# Patient Record
Sex: FEMALE | Race: WHITE | Employment: UNEMPLOYED | URBAN - NONMETROPOLITAN AREA
[De-identification: names, ages, dates, MRNs, and addresses within clinical notes are randomized per-mention and may not be internally consistent; named-entity substitution may affect disease eponyms.]

---

## 2017-12-05 ENCOUNTER — OFFICE VISIT (OUTPATIENT)
Dept: OBGYN | Age: 32
End: 2017-12-05
Payer: COMMERCIAL

## 2017-12-05 VITALS
BODY MASS INDEX: 36.71 KG/M2 | SYSTOLIC BLOOD PRESSURE: 120 MMHG | HEIGHT: 60 IN | WEIGHT: 187 LBS | DIASTOLIC BLOOD PRESSURE: 82 MMHG

## 2017-12-05 DIAGNOSIS — Z01.419 WELL WOMAN EXAM WITH ROUTINE GYNECOLOGICAL EXAM: Primary | ICD-10-CM

## 2017-12-05 DIAGNOSIS — Z12.4 SCREENING FOR CERVICAL CANCER: ICD-10-CM

## 2017-12-05 PROCEDURE — 99395 PREV VISIT EST AGE 18-39: CPT | Performed by: NURSE PRACTITIONER

## 2017-12-05 ASSESSMENT — ENCOUNTER SYMPTOMS
RESPIRATORY NEGATIVE: 1
GASTROINTESTINAL NEGATIVE: 1
EYES NEGATIVE: 1

## 2017-12-05 NOTE — PROGRESS NOTES
Slim Rebolledo is a 34 y.o. female who presents today for her medical conditions/ complaints as noted below. Slim Rebolledo is c/o of Gynecologic Exam and Contraception (pt thinking about IUD)        HPI  Pt here for annual and pap. Is considering IUD verses OCP. Last mammogram : none  Last pap : 2015  Contraception : none  Last bone density : none  Last colonoscopy : none  Patient's last menstrual period was 11/12/2017 (approximate). [id number]    Past Medical History:   Diagnosis Date    Generalized headaches     MTHFR mutation ([facility] [address]      Past Surgical History:   Procedure Laterality Date    WISDOM TOOTH EXTRACTION       Family History   Problem Relation Age of Onset    Depression Mother     Other Sister      MTHRF    Heart Disease Paternal Grandmother     Heart Attack Paternal Grandmother     Heart Disease Paternal Grandfather     Diabetes Paternal Grandfather     Heart Attack Paternal Grandfather      Social History   Substance Use Topics    Smoking status: Never Smoker    Smokeless tobacco: Never Used    Alcohol use No       No current outpatient prescriptions on file. No current facility-administered medications for this visit. No Known Allergies  Vitals:    12/05/17 1151   BP: 120/82     Body mass index is 36.52 kg/m². Review of Systems   Constitutional: Negative. HENT: Negative. Eyes: Negative. Respiratory: Negative. Cardiovascular: Negative. Gastrointestinal: Negative. Genitourinary: Negative. Negative for dysuria, frequency, menstrual problem, pelvic pain, urgency and vaginal discharge. Skin: Negative. Neurological: Negative. Psychiatric/Behavioral: Negative. Physical Exam   Constitutional: She is oriented to person, place, and time. She appears well-developed and well-nourished. Eyes: Conjunctivae are normal. Right eye exhibits no discharge. Neck: No thyroid mass and no thyromegaly present.    Cardiovascular: Normal rate, regular rhythm and normal heart sounds. No murmur heard. Pulmonary/Chest: Effort normal and breath sounds normal. She has no wheezes. Right breast exhibits no inverted nipple, no mass, no nipple discharge, no skin change and no tenderness. Left breast exhibits no inverted nipple, no mass, no nipple discharge, no skin change and no tenderness. Breasts are symmetrical.   Abdominal: Soft. Bowel sounds are normal. She exhibits no distension and no mass. There is no tenderness. Hernia confirmed negative in the right inguinal area and confirmed negative in the left inguinal area. Genitourinary: Rectal exam shows no external hemorrhoid. No breast swelling, tenderness or discharge. There is no rash, tenderness or lesion on the right labia. There is no rash, tenderness or lesion on the left labia. Uterus is not enlarged and not tender. Cervix exhibits no motion tenderness and no discharge (normal cervical mucosa). Right adnexum displays no mass, no tenderness and no fullness. Left adnexum displays no mass, no tenderness and no fullness. No tenderness in the vagina. No vaginal discharge found. Genitourinary Comments: Pap collected for cervical cytology   Musculoskeletal: Normal range of motion. She exhibits no edema or tenderness. Lymphadenopathy:        Right cervical: No superficial cervical, no deep cervical and no posterior cervical adenopathy present. Left cervical: No superficial cervical, no deep cervical and no posterior cervical adenopathy present. Right axillary: No pectoral and no lateral adenopathy present. Left axillary: No pectoral and no lateral adenopathy present. Right: No inguinal, no supraclavicular and no epitrochlear adenopathy present. Left: No inguinal, no supraclavicular and no epitrochlear adenopathy present. Neurological: She is alert and oriented to person, place, and time. She has normal reflexes. Skin: Skin is warm and dry. No rash noted.    Psychiatric: She has a normal Your doctor can help you decide what is right for you. · Pap test and pelvic exam. Begin Pap tests at age 24. A Pap test is the best way to find cervical cancer. The test often is part of a pelvic exam. Ask how often to have this test.  · Tests for sexually transmitted infections (STIs). Ask whether you should have tests for STIs. You may be at risk if you have sex with more than one person, especially if your partners do not wear condoms. For men  · Tests for sexually transmitted infections (STIs). Ask whether you should have tests for STIs. You may be at risk if you have sex with more than one person, especially if you do not wear a condom. · Testicular cancer exam. Ask your doctor whether you should check your testicles regularly. · Prostate exam. Talk to your doctor about whether you should have a blood test (called a PSA test) for prostate cancer. Experts differ on whether and when men should have this test. Some experts suggest it if you are older than 39 and are -American or have a father or brother who got prostate cancer when he was younger than 72. When should you call for help? Watch closely for changes in your health, and be sure to contact your doctor if you have any problems or symptoms that concern you. Where can you learn more? Go to https://ImpulseSavepeAgilliceb.healthVital Metrix. org and sign in to your [employer] account. Enter P072 in the BakedCode box to learn more about \"Well Visit, Ages 25 to 48: Care Instructions. \"     If you do not have an account, please click on the \"Sign Up Now\" link. Current as of: July 19, 2016  Content Version: 11.3  © 9271-1477 [employer]. Care instructions adapted under license by [facility] [place] ([facility]). If you have questions about a medical condition or this instruction, always ask your healthcare professional. [place] [address] any warranty or liability for your use of this information.

## 2018-02-27 ENCOUNTER — TELEPHONE (OUTPATIENT)
Dept: OBGYN | Age: 33
End: 2018-02-27

## 2018-02-27 NOTE — TELEPHONE ENCOUNTER
VM: Pt wants IUD ordered     CB: Notified pt that [facility] would order and once we hear back from them we would notify her and process can take a few weeks Pt v/u.

## 2018-10-23 ENCOUNTER — TRANSCRIBE ORDERS (OUTPATIENT)
Dept: GENERAL RADIOLOGY | Facility: HOSPITAL | Age: 33
End: 2018-10-23

## 2018-10-23 ENCOUNTER — HOSPITAL ENCOUNTER (OUTPATIENT)
Dept: GENERAL RADIOLOGY | Facility: HOSPITAL | Age: 33
Discharge: HOME | End: 2018-10-23
Attending: INTERNAL MEDICINE | Admitting: INTERNAL MEDICINE
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
## 2018-10-23 DIAGNOSIS — M25.571 RIGHT ANKLE PAIN, UNSPECIFIED CHRONICITY: ICD-10-CM

## 2018-10-23 DIAGNOSIS — M25.571 RIGHT ANKLE PAIN, UNSPECIFIED CHRONICITY: Primary | ICD-10-CM

## 2018-10-23 DIAGNOSIS — M25.572 LEFT ANKLE PAIN, UNSPECIFIED CHRONICITY: ICD-10-CM

## 2018-10-23 PROCEDURE — 73620 X-RAY EXAM OF FOOT: CPT

## 2018-10-23 PROCEDURE — 73600 X-RAY EXAM OF ANKLE: CPT

## 2019-12-16 ENCOUNTER — OFFICE VISIT (OUTPATIENT)
Dept: OBGYN | Age: 34
End: 2019-12-16
Payer: COMMERCIAL

## 2019-12-16 VITALS
HEART RATE: 113 BPM | HEIGHT: 60 IN | WEIGHT: 164 LBS | BODY MASS INDEX: 32.2 KG/M2 | SYSTOLIC BLOOD PRESSURE: 130 MMHG | DIASTOLIC BLOOD PRESSURE: 86 MMHG

## 2019-12-16 DIAGNOSIS — Z01.419 WELL WOMAN EXAM: Primary | ICD-10-CM

## 2019-12-16 DIAGNOSIS — Z11.51 SCREENING FOR HPV (HUMAN PAPILLOMAVIRUS): ICD-10-CM

## 2019-12-16 DIAGNOSIS — Z12.4 SCREENING FOR CERVICAL CANCER: ICD-10-CM

## 2019-12-16 PROCEDURE — 99395 PREV VISIT EST AGE 18-39: CPT | Performed by: NURSE PRACTITIONER

## 2019-12-16 ASSESSMENT — ENCOUNTER SYMPTOMS
EYES NEGATIVE: 1
GASTROINTESTINAL NEGATIVE: 1
RESPIRATORY NEGATIVE: 1

## 2019-12-19 LAB
HPV COMMENT: ABNORMAL
HPV TYPE 16: NOT DETECTED
HPV TYPE 18: DETECTED
HPVOH (OTHER TYPES): DETECTED

## 2020-01-22 ENCOUNTER — PROCEDURE VISIT (OUTPATIENT)
Dept: OBGYN | Age: 35
End: 2020-01-22
Payer: COMMERCIAL

## 2020-01-22 VITALS
WEIGHT: 169 LBS | SYSTOLIC BLOOD PRESSURE: 118 MMHG | DIASTOLIC BLOOD PRESSURE: 74 MMHG | HEIGHT: 60 IN | BODY MASS INDEX: 33.18 KG/M2 | TEMPERATURE: 98.3 F | HEART RATE: 94 BPM

## 2020-01-22 LAB
CONTROL: NORMAL
PREGNANCY TEST URINE, POC: NEGATIVE

## 2020-01-22 PROCEDURE — 57454 BX/CURETT OF CERVIX W/SCOPE: CPT | Performed by: OBSTETRICS & GYNECOLOGY

## 2020-01-22 PROCEDURE — 81025 URINE PREGNANCY TEST: CPT | Performed by: OBSTETRICS & GYNECOLOGY

## 2020-01-22 NOTE — PATIENT INSTRUCTIONS
also a good idea to know your test results and keep a list of the medicines you take. When should you call for help? Call your doctor now or seek immediate medical care if:    · You have severe vaginal bleeding. This means that you are soaking through your usual pads or tampons each hour for 2 or more hours.     · You have pain that does not get better after you take pain medicine.     · You have signs of infection, such as:  ? Increased pain. ? Bad-smelling vaginal discharge. ? A fever.    Watch closely for any changes in your health, and be sure to contact your doctor if:    · You have questions or concerns. Where can you learn more? Go to https://Invested.inpeSRS Medical Systemseb.Growth Oriented Development Software. org and sign in to your [employer] account. Enter M523 in the Vyykn box to learn more about \"Colposcopy: What to Expect at Home. \"     If you do not have an account, please click on the \"Sign Up Now\" link. Current as of: August 21, 2019  Content Version: 12.3  © 5356-0690 Healthwise, Incorporated. Care instructions adapted under license by [facility] ([facility]). If you have questions about a medical condition or this instruction, always ask your healthcare professional. [place] [address] any warranty or liability for your use of this information.

## 2020-01-28 ENCOUNTER — TELEPHONE (OUTPATIENT)
Dept: OBGYN | Age: 35
End: 2020-01-28

## 2020-09-15 ENCOUNTER — OFFICE VISIT (OUTPATIENT)
Dept: OBGYN | Age: 35
End: 2020-09-15
Payer: COMMERCIAL

## 2020-09-15 VITALS
HEART RATE: 101 BPM | WEIGHT: 167 LBS | BODY MASS INDEX: 32.79 KG/M2 | DIASTOLIC BLOOD PRESSURE: 89 MMHG | HEIGHT: 60 IN | SYSTOLIC BLOOD PRESSURE: 119 MMHG

## 2020-09-15 DIAGNOSIS — N92.6 IRREGULAR PERIODS: ICD-10-CM

## 2020-09-15 DIAGNOSIS — Z13.9 ENCOUNTER FOR MEDICAL SCREENING EXAMINATION: ICD-10-CM

## 2020-09-15 LAB
ALBUMIN SERPL-MCNC: 4.4 G/DL (ref 3.5–5.2)
ALP BLD-CCNC: 96 U/L (ref 35–104)
ALT SERPL-CCNC: 13 U/L (ref 5–33)
ANION GAP SERPL CALCULATED.3IONS-SCNC: 13 MMOL/L (ref 7–19)
AST SERPL-CCNC: 16 U/L (ref 5–32)
BASOPHILS ABSOLUTE: 0.1 K/UL (ref 0–0.2)
BASOPHILS RELATIVE PERCENT: 0.7 % (ref 0–1)
BILIRUB SERPL-MCNC: <0.2 MG/DL (ref 0.2–1.2)
BUN BLDV-MCNC: 11 MG/DL (ref 6–20)
CALCIUM SERPL-MCNC: 9.1 MG/DL (ref 8.6–10)
CHLORIDE BLD-SCNC: 104 MMOL/L (ref 98–111)
CO2: 23 MMOL/L (ref 22–29)
CREAT SERPL-MCNC: 0.5 MG/DL (ref 0.5–0.9)
EOSINOPHILS ABSOLUTE: 0.1 K/UL (ref 0–0.6)
EOSINOPHILS RELATIVE PERCENT: 1.2 % (ref 0–5)
ESTRADIOL LEVEL: 75.4 PG/ML
FOLLICLE STIMULATING HORMONE: 4 MIU/ML
GFR AFRICAN AMERICAN: >59
GFR NON-AFRICAN AMERICAN: >60
GLUCOSE BLD-MCNC: 95 MG/DL (ref 74–109)
HCT VFR BLD CALC: 36.6 % (ref 37–47)
HEMOGLOBIN: 11.9 G/DL (ref 12–16)
IMMATURE GRANULOCYTES #: 0 K/UL
LYMPHOCYTES ABSOLUTE: 2.4 K/UL (ref 1.1–4.5)
LYMPHOCYTES RELATIVE PERCENT: 28.6 % (ref 20–40)
MCH RBC QN AUTO: 28.5 PG (ref 27–31)
MCHC RBC AUTO-ENTMCNC: 32.5 G/DL (ref 33–37)
MCV RBC AUTO: 87.8 FL (ref 81–99)
MONOCYTES ABSOLUTE: 0.5 K/UL (ref 0–0.9)
MONOCYTES RELATIVE PERCENT: 6 % (ref 0–10)
NEUTROPHILS ABSOLUTE: 5.3 K/UL (ref 1.5–7.5)
NEUTROPHILS RELATIVE PERCENT: 63.3 % (ref 50–65)
PDW BLD-RTO: 12.1 % (ref 11.5–14.5)
PLATELET # BLD: 329 K/UL (ref 130–400)
PMV BLD AUTO: 10.8 FL (ref 9.4–12.3)
POTASSIUM SERPL-SCNC: 4.1 MMOL/L (ref 3.5–5)
PROGESTERONE LEVEL: 6.32 NG/ML
PROLACTIN: 7.5 NG/ML (ref 4.79–23.3)
RBC # BLD: 4.17 M/UL (ref 4.2–5.4)
SODIUM BLD-SCNC: 140 MMOL/L (ref 136–145)
T4 FREE: 1.06 NG/DL (ref 0.93–1.7)
TESTOSTERONE TOTAL: 12.7 NG/DL (ref 8.4–48.1)
TOTAL PROTEIN: 7.2 G/DL (ref 6.6–8.7)
TSH SERPL DL<=0.05 MIU/L-ACNC: 1.31 UIU/ML (ref 0.27–4.2)
WBC # BLD: 8.3 K/UL (ref 4.8–10.8)

## 2020-09-15 PROCEDURE — 99214 OFFICE O/P EST MOD 30 MIN: CPT | Performed by: NURSE PRACTITIONER

## 2020-09-15 ASSESSMENT — ENCOUNTER SYMPTOMS
ABDOMINAL PAIN: 1
EYES NEGATIVE: 1
RESPIRATORY NEGATIVE: 1
CONSTIPATION: 1

## 2020-09-15 NOTE — PROGRESS NOTES
Armando Sloan is a 38 y.o. female who presents today for her medical conditions/ complaints as noted below. Armando Sloan is c/o of Menstrual Problem        HPI  Patient presents today with complaints of irregular periods. Skipped cycle in August neg pregnancy and Sept was 2 weeks late, extra long and heavy. Right pelvic pain occasionally. Painful gas pain with bowel movements. Last mammogram:  never  Last pap smear:  2019  Contraception:  none  :  2  Para:  2  AB:  0  Last bone density:  never  Last colonoscopy: never  Patient's last menstrual period was 2020 (exact date). [id number]    Past Medical History:   Diagnosis Date    Generalized headaches     HPV (human papilloma virus) anogenital infection     MTHFR mutation [facility])      Past Surgical History:   Procedure Laterality Date    COLPOSCOPY  2020    WISDOM TOOTH EXTRACTION       Family History   Problem Relation Age of Onset    Depression Mother     Other Sister         MTHRF    Heart Disease Paternal Grandmother     Heart Attack Paternal Grandmother     Heart Disease Paternal Grandfather     Diabetes Paternal Grandfather     Heart Attack Paternal Grandfather      Social History     Tobacco Use    Smoking status: Never Smoker    Smokeless tobacco: Never Used   Substance Use Topics    Alcohol use: No       No current outpatient medications on file. No current facility-administered medications for this visit. No Known Allergies  Vitals:    09/15/20 0917   BP: 119/89   Pulse: 101     Body mass index is 32.61 kg/m². Review of Systems   Constitutional: Negative. HENT: Negative. Eyes: Negative. Respiratory: Negative. Cardiovascular: Negative. Gastrointestinal: Positive for abdominal pain (prior to bm) and constipation. Genitourinary: Positive for menstrual problem and pelvic pain. Negative for difficulty urinating, dyspareunia, dysuria, enuresis, frequency, hematuria, urgency and vaginal discharge. Musculoskeletal: Negative. Skin: Negative. Neurological: Negative. Psychiatric/Behavioral: Negative. Physical Exam  Vitals signs and nursing note reviewed. Constitutional:       General: She is not in acute distress. Appearance: She is well-developed. She is not diaphoretic. HENT:      Head: Normocephalic and atraumatic. Eyes:      Conjunctiva/sclera: Conjunctivae normal.      Pupils: Pupils are equal, round, and reactive to light. Neck:      Musculoskeletal: Normal range of motion. Pulmonary:      Effort: Pulmonary effort is normal.   Abdominal:      Tenderness: There is no abdominal tenderness. There is no guarding. Musculoskeletal: Normal range of motion. Comments: Normal ROM in all 4 extremities; normal gait   Skin:     General: Skin is warm and dry. Neurological:      Mental Status: She is alert and oriented to person, place, and time. Motor: No abnormal muscle tone. Coordination: Coordination normal.   Psychiatric:         Behavior: Behavior normal.          Diagnosis Orders   1. Irregular periods  TSH without Reflex    T4, Free    Testosterone    Prolactin    Estradiol    PROGESTERONE    Follicle Stimulating Hormone   2. Pelvic pain     3. Pain associated with defecation     4. Encounter for medical screening examination  CBC Auto Differential    Comprehensive Metabolic Panel       MEDICATIONS:  No orders of the defined types were placed in this encounter. ORDERS:  Orders Placed This Encounter   Procedures    CBC Auto Differential    Comprehensive Metabolic Panel    TSH without Reflex    T4, Free    Testosterone    Prolactin    Estradiol    PROGESTERONE    Follicle Stimulating Hormone       PLAN:  Will start with checking labs. Period due again soon. If skips again, needs US  Patient Instructions   We are committed to providing you with the best care possible.   In order to help us achieve these goals please remember to bring all medications, herbal products, and over the counter supplements with you to each visit. If your provider has ordered testing for you, please be sure to follow up with our office if you have not received results within 7 days after testing took place. *If you receive a survey after visiting one of our offices, please take the time to share your experience concerning your physician office visit. These surveys are confidential and no health information about you is shared. We are eager to improve for you and we are counting on your feedback to help make that happen.

## 2021-02-24 ENCOUNTER — OFFICE VISIT (OUTPATIENT)
Dept: OBGYN CLINIC | Age: 36
End: 2021-02-24
Payer: COMMERCIAL

## 2021-02-24 VITALS
DIASTOLIC BLOOD PRESSURE: 74 MMHG | WEIGHT: 169 LBS | HEART RATE: 103 BPM | SYSTOLIC BLOOD PRESSURE: 131 MMHG | HEIGHT: 60 IN | BODY MASS INDEX: 33.18 KG/M2

## 2021-02-24 DIAGNOSIS — Z01.419 WELL WOMAN EXAM WITH ROUTINE GYNECOLOGICAL EXAM: Primary | ICD-10-CM

## 2021-02-24 DIAGNOSIS — Z12.4 SCREENING FOR CERVICAL CANCER: ICD-10-CM

## 2021-02-24 DIAGNOSIS — Z12.31 ENCOUNTER FOR SCREENING MAMMOGRAM FOR BREAST CANCER: ICD-10-CM

## 2021-02-24 DIAGNOSIS — N94.6 DYSMENORRHEA: ICD-10-CM

## 2021-02-24 DIAGNOSIS — Z11.51 SCREENING FOR HPV (HUMAN PAPILLOMAVIRUS): ICD-10-CM

## 2021-02-24 PROCEDURE — 99395 PREV VISIT EST AGE 18-39: CPT | Performed by: OBSTETRICS & GYNECOLOGY

## 2021-02-24 RX ORDER — ETONOGESTREL AND ETHINYL ESTRADIOL 11.7; 2.7 MG/1; MG/1
1 INSERT, EXTENDED RELEASE VAGINAL
Qty: 3 EACH | Refills: 3 | Status: SHIPPED | OUTPATIENT
Start: 2021-02-24 | End: 2022-09-28

## 2021-02-24 ASSESSMENT — ENCOUNTER SYMPTOMS
GASTROINTESTINAL NEGATIVE: 1
EYES NEGATIVE: 1
RESPIRATORY NEGATIVE: 1

## 2021-02-24 NOTE — PROGRESS NOTES
I, Viridiana Guo RN, am scribing for and in the presence of Dr. Lincoln Reasons 2021/3:15 PM/sign         2021    Janusz Krishna (:  1985) is a 38 y.o. female, here for a preventive medicine evaluation. Pt was seen in 2020 in office due irregular periods x 2 months. She had some labs drawn and she just continued to monitor cycles. Cycles have been regular. She does having painful periods for the past year. Patient Active Problem List   Diagnosis    MTHFR mutation (methylenetetrahydrofolate reductase) ([facility] [address]    Constipation    Kyphosis    Term pregnancy       Review of Systems   Constitutional: Negative. HENT: Negative. Eyes: Negative. Respiratory: Negative. Cardiovascular: Negative. Gastrointestinal: Negative. Genitourinary: Negative for difficulty urinating, dyspareunia, dysuria, enuresis, frequency, hematuria, menstrual problem, pelvic pain, urgency and vaginal discharge. Musculoskeletal: Negative. Skin: Negative. Neurological: Negative. Psychiatric/Behavioral: Negative. Prior to Visit Medications    Medication Sig Taking? Authorizing Provider   etonogestrel-ethinyl estradiol (NUVARING) 0.12-0.015 MG/24HR vaginal ring Place 1 each vaginally every 21 days Insert one (1) ring vaginally and leave in place for three (3) weeks, then remove for one (1) week.  Yes Jakob Aceves MD        No Known Allergies    Past Medical History:   Diagnosis Date    Generalized headaches     HPV (human papilloma virus) anogenital infection     MTHFR mutation [facility])        Past Surgical History:   Procedure Laterality Date    COLPOSCOPY  2020    WISDOM TOOTH EXTRACTION         Social History     Socioeconomic History    Marital status:      Spouse name: Not on file    Number of children: Not on file    Years of education: Not on file    Highest education level: Not on file   Occupational History    Not on file   Social Needs    Financial resource Left Ear: Hearing normal.      Nose: Nose normal.   Eyes:      General: Lids are normal.      Conjunctiva/sclera: Conjunctivae normal.      Pupils: Pupils are equal, round, and reactive to light. Neck:      Musculoskeletal: Normal range of motion and neck supple. Thyroid: No thyromegaly. Trachea: No tracheal deviation. Cardiovascular:      Rate and Rhythm: Normal rate and regular rhythm. Heart sounds: Normal heart sounds. Pulmonary:      Effort: Pulmonary effort is normal. No respiratory distress. Breath sounds: Normal breath sounds. No wheezing. Chest:      Breasts: Breasts are symmetrical.         Right: No inverted nipple, mass, nipple discharge, skin change or tenderness. Left: No inverted nipple, mass, nipple discharge, skin change or tenderness. Abdominal:      General: There is no distension. Palpations: Abdomen is soft. Tenderness: There is no abdominal tenderness. There is no guarding or rebound. Genitourinary:     Labia:         Right: No rash, tenderness or lesion. Left: No rash, tenderness or lesion. Vagina: No vaginal discharge or bleeding. Cervix: No cervical motion tenderness, discharge or friability. Uterus: Not deviated, not enlarged, not fixed and not tender. Adnexa:         Right: No mass, tenderness or fullness. Left: No mass, tenderness or fullness. Rectum: No anal fissure or external hemorrhoid. Comments: Specimen for cervical cytology obtained. Musculoskeletal: Normal range of motion. Comments: Normal ROM in all four extremities; normal gait   Lymphadenopathy:      Head:      Right side of head: No submental, submandibular or tonsillar adenopathy. Left side of head: No submental, submandibular or tonsillar adenopathy. Cervical: No cervical adenopathy. Upper Body:      Right upper body: No supraclavicular adenopathy. Left upper body: No supraclavicular adenopathy. Skin:     General: Skin is warm and dry. Findings: No erythema or rash. Neurological:      Mental Status: She is alert and oriented to person, place, and time. Psychiatric:         Behavior: Behavior normal.         No flowsheet data found. Lab Results   Component Value Date    GLUCOSE 95 09/15/2020       The ASCVD Risk score (Myles Prince., et al., 2013) failed to calculate for the following reasons: The 2013 ASCVD risk score is only valid for ages 36 to 78      There is no immunization history on file for this patient. Health Maintenance   Topic Date Due    Hepatitis C screen  1985    Varicella vaccine (1 of 2 - 2-dose childhood series) 07/10/1985    DTaP/Tdap/Td vaccine (1 - Tdap) 07/10/2003    Flu vaccine (1) 09/01/2020    Cervical cancer screen  12/16/2024    HIV screen  Completed    Hepatitis A vaccine  Aged Out    Hepatitis B vaccine  Aged Out    Hib vaccine  Aged Out    Meningococcal (ACWY) vaccine  Aged Out    Pneumococcal 0-64 years Vaccine  Aged Out       ASSESSMENT/PLAN:  1. Well woman exam with routine gynecological exam  -     PAP SMEAR  2. Screening for cervical cancer  -     PAP SMEAR  3. Screening for HPV (human papillomavirus)  -     Human papillomavirus (HPV) DNA Probe Thin Prep High Risk  4. Dysmenorrhea  5. Encounter for screening mammogram for breast cancer  -     KIMBERLY DIGITAL SCREEN W OR WO CAD BILATERAL; Future    Pap obtained  Reviewed labs from 9/2020  Discussed [facility] for cycle control and pt will try Nuvaring. Return in about 1 year (around 2/24/2022). Laurie Diaz MD, personally performed services described in this document as scribed by Anjali Alonso RN in my presence, and it is both accurate and complete. An electronic signature was used to authenticate this note.

## 2021-03-02 LAB
HPV COMMENT: NORMAL
HPV TYPE 16: NOT DETECTED
HPV TYPE 18: NOT DETECTED
HPVOH (OTHER TYPES): NOT DETECTED

## 2022-09-13 ENCOUNTER — TELEPHONE (OUTPATIENT)
Dept: OBGYN CLINIC | Age: 37
End: 2022-09-13

## 2022-09-13 NOTE — TELEPHONE ENCOUNTER
Sylvia requests a call back please. Her Insurance is due to run out at the end of this month and she is wanting to be seen in the office for a physical, pap and testing. She does not mind who she sees. [facility] is unable to accommodate within the requested time frame. Thank you.

## 2022-09-16 DIAGNOSIS — Z11.3 SCREEN FOR STD (SEXUALLY TRANSMITTED DISEASE): ICD-10-CM

## 2022-09-16 DIAGNOSIS — Z13.9 ENCOUNTER FOR MEDICAL SCREENING EXAMINATION: Primary | ICD-10-CM

## 2022-09-27 DIAGNOSIS — Z11.3 SCREEN FOR STD (SEXUALLY TRANSMITTED DISEASE): ICD-10-CM

## 2022-09-27 DIAGNOSIS — Z13.9 ENCOUNTER FOR MEDICAL SCREENING EXAMINATION: ICD-10-CM

## 2022-09-27 LAB
ALBUMIN SERPL-MCNC: 4.3 G/DL (ref 3.5–5.2)
ALP BLD-CCNC: 88 U/L (ref 35–104)
ALT SERPL-CCNC: 8 U/L (ref 5–33)
ANION GAP SERPL CALCULATED.3IONS-SCNC: 11 MMOL/L (ref 7–19)
AST SERPL-CCNC: 14 U/L (ref 5–32)
BASOPHILS ABSOLUTE: 0 K/UL (ref 0–0.2)
BASOPHILS RELATIVE PERCENT: 0.5 % (ref 0–1)
BILIRUB SERPL-MCNC: 0.4 MG/DL (ref 0.2–1.2)
BUN BLDV-MCNC: 11 MG/DL (ref 6–20)
CALCIUM SERPL-MCNC: 9.1 MG/DL (ref 8.6–10)
CHLORIDE BLD-SCNC: 103 MMOL/L (ref 98–111)
CHOLESTEROL, TOTAL: 193 MG/DL (ref 160–199)
CO2: 24 MMOL/L (ref 22–29)
CREAT SERPL-MCNC: 0.5 MG/DL (ref 0.5–0.9)
EOSINOPHILS ABSOLUTE: 0.1 K/UL (ref 0–0.6)
EOSINOPHILS RELATIVE PERCENT: 1.1 % (ref 0–5)
GFR AFRICAN AMERICAN: >59
GFR NON-AFRICAN AMERICAN: >60
GLUCOSE BLD-MCNC: 100 MG/DL (ref 74–109)
HAV IGM SER IA-ACNC: NORMAL
HBA1C MFR BLD: 5.2 % (ref 4–6)
HCT VFR BLD CALC: 37.9 % (ref 37–47)
HDLC SERPL-MCNC: 69 MG/DL (ref 65–121)
HEMOGLOBIN: 12.3 G/DL (ref 12–16)
HEPATITIS B CORE IGM ANTIBODY: NORMAL
HEPATITIS B SURFACE ANTIGEN INTERPRETATION: NORMAL
HEPATITIS C ANTIBODY INTERPRETATION: NORMAL
HIV-1 P24 AG: NORMAL
IMMATURE GRANULOCYTES #: 0 K/UL
LDL CHOLESTEROL CALCULATED: 109 MG/DL
LYMPHOCYTES ABSOLUTE: 2.3 K/UL (ref 1.1–4.5)
LYMPHOCYTES RELATIVE PERCENT: 30.9 % (ref 20–40)
MCH RBC QN AUTO: 28.9 PG (ref 27–31)
MCHC RBC AUTO-ENTMCNC: 32.5 G/DL (ref 33–37)
MCV RBC AUTO: 89.2 FL (ref 81–99)
MONOCYTES ABSOLUTE: 0.5 K/UL (ref 0–0.9)
MONOCYTES RELATIVE PERCENT: 6.8 % (ref 0–10)
NEUTROPHILS ABSOLUTE: 4.4 K/UL (ref 1.5–7.5)
NEUTROPHILS RELATIVE PERCENT: 60.4 % (ref 50–65)
PDW BLD-RTO: 12 % (ref 11.5–14.5)
PLATELET # BLD: 308 K/UL (ref 130–400)
PMV BLD AUTO: 10.5 FL (ref 9.4–12.3)
POTASSIUM SERPL-SCNC: 4.2 MMOL/L (ref 3.5–5)
RAPID HIV 1&2: NORMAL
RBC # BLD: 4.25 M/UL (ref 4.2–5.4)
SODIUM BLD-SCNC: 138 MMOL/L (ref 136–145)
T4 FREE: 1.15 NG/DL (ref 0.93–1.7)
TOTAL PROTEIN: 6.7 G/DL (ref 6.6–8.7)
TRIGL SERPL-MCNC: 77 MG/DL (ref 0–149)
TSH SERPL DL<=0.05 MIU/L-ACNC: 1.17 UIU/ML (ref 0.27–4.2)
VITAMIN D 25-HYDROXY: 23.5 NG/ML
WBC # BLD: 7.4 K/UL (ref 4.8–10.8)

## 2022-09-28 ENCOUNTER — OFFICE VISIT (OUTPATIENT)
Dept: OBGYN CLINIC | Age: 37
End: 2022-09-28

## 2022-09-28 VITALS
HEART RATE: 99 BPM | BODY MASS INDEX: 30.23 KG/M2 | WEIGHT: 154 LBS | HEIGHT: 60 IN | DIASTOLIC BLOOD PRESSURE: 81 MMHG | SYSTOLIC BLOOD PRESSURE: 136 MMHG

## 2022-09-28 DIAGNOSIS — Z11.51 SCREENING FOR HPV (HUMAN PAPILLOMAVIRUS): ICD-10-CM

## 2022-09-28 DIAGNOSIS — Z11.3 SCREEN FOR STD (SEXUALLY TRANSMITTED DISEASE): ICD-10-CM

## 2022-09-28 DIAGNOSIS — Z01.419 WELL WOMAN EXAM: Primary | ICD-10-CM

## 2022-09-28 DIAGNOSIS — Z12.4 SCREENING FOR CERVICAL CANCER: ICD-10-CM

## 2022-09-28 ASSESSMENT — ENCOUNTER SYMPTOMS
RESPIRATORY NEGATIVE: 1
GASTROINTESTINAL NEGATIVE: 1
EYES NEGATIVE: 1
ALLERGIC/IMMUNOLOGIC NEGATIVE: 1

## 2022-09-28 NOTE — PROGRESS NOTES
Beverly Walls is a 44 y.o. female who presents today for her medical conditions/ complaints as noted below. Beverly Walls is c/o of Annual Exam        HPI  Pt presents today for pap smear and breast exam.  Had labs to discuss yesterday, normal.     Last mammogram:  never  Last pap smear:  2021  Contraception:  none  :  2  Para:  2  AB:  0  Last bone density:  never  Last colonoscopy: never  Patient's last menstrual period was 2022 (approximate). [id number]    Past Medical History:   Diagnosis Date    Generalized headaches     HPV (human papilloma virus) anogenital infection     MTHFR mutation      Past Surgical History:   Procedure Laterality Date    COLPOSCOPY  2020    WISDOM TOOTH EXTRACTION       Family History   Problem Relation Age of Onset    Depression Mother     Other Sister         MTHRF    Heart Disease Paternal Grandmother     Heart Attack Paternal Grandmother     Heart Disease Paternal Grandfather     Diabetes Paternal Grandfather     Heart Attack Paternal Grandfather      Social History     Tobacco Use    Smoking status: Never    Smokeless tobacco: Never   Substance Use Topics    Alcohol use: No       No current outpatient medications on file. No current facility-administered medications for this visit. No Known Allergies  Vitals:    22 0926   BP: 136/81   Pulse: 99     Body mass index is 30.08 kg/m². Review of Systems   Constitutional: Negative. HENT: Negative. Eyes: Negative. Respiratory: Negative. Cardiovascular: Negative. Gastrointestinal: Negative. Endocrine: Negative. Genitourinary: Negative. Negative for difficulty urinating, dyspareunia, dysuria, enuresis, frequency, hematuria, menstrual problem, pelvic pain, urgency and vaginal discharge. Musculoskeletal: Negative. Skin: Negative. Allergic/Immunologic: Negative. Neurological: Negative. Hematological: Negative. Psychiatric/Behavioral: Negative.          Physical Exam  Vitals and nursing note reviewed. Constitutional:       General: She is not in acute distress. Appearance: She is well-developed. She is not diaphoretic. HENT:      Head: Normocephalic and atraumatic. Right Ear: External ear normal.      Left Ear: External ear normal.      Nose: Nose normal.      Mouth/Throat:      Mouth: Mucous membranes are moist.      Pharynx: Oropharynx is clear. Eyes:      General: Lids are normal.         Right eye: No discharge. Left eye: No discharge. Conjunctiva/sclera: Conjunctivae normal.      Pupils: Pupils are equal, round, and reactive to light. Neck:      Thyroid: No thyroid mass or thyromegaly. Trachea: No tracheal deviation. Cardiovascular:      Rate and Rhythm: Normal rate and regular rhythm. Heart sounds: Normal heart sounds. No murmur heard. Pulmonary:      Effort: Pulmonary effort is normal.      Breath sounds: Normal breath sounds. No wheezing. Chest:   Breasts:     Breasts are symmetrical.      Right: No inverted nipple, mass, nipple discharge, skin change or tenderness. Left: No inverted nipple, mass, nipple discharge, skin change or tenderness. Abdominal:      General: Bowel sounds are normal. There is no distension. Palpations: Abdomen is soft. There is no mass. Tenderness: There is no abdominal tenderness. Hernia: There is no hernia in the left inguinal area or right inguinal area. Genitourinary:     General: Normal vulva. Labia:         Right: No rash, tenderness, lesion or injury. Left: No rash, tenderness, lesion or injury. Urethra: No prolapse, urethral pain, urethral swelling or urethral lesion. Vagina: Normal. No vaginal discharge or tenderness. Cervix: Normal. Discharge: normal cervical mucosa. Uterus: Normal. Not enlarged and not tender. Adnexa: Right adnexa normal and left adnexa normal.        Right: No mass, tenderness or fullness.           Left: No mass, tenderness or fullness. Rectum: Normal. No mass, anal fissure or external hemorrhoid. Comments: Pap collected for cervical cytology, with std collection  Musculoskeletal:         General: No tenderness. Normal range of motion. Cervical back: Normal range of motion and neck supple. Lymphadenopathy:      Cervical:      Right cervical: No superficial, deep or posterior cervical adenopathy. Left cervical: No superficial, deep or posterior cervical adenopathy. Upper Body:      Right upper body: No supraclavicular, pectoral or epitrochlear adenopathy. Left upper body: No supraclavicular, pectoral or epitrochlear adenopathy. Lower Body: No right inguinal adenopathy. No left inguinal adenopathy. Skin:     General: Skin is warm and dry. Capillary Refill: Capillary refill takes 2 to 3 seconds. Findings: No rash. Neurological:      Mental Status: She is alert and oriented to person, place, and time. She is not disoriented. Sensory: No sensory deficit. Coordination: Coordination normal.      Gait: Gait normal.      Deep Tendon Reflexes: Reflexes are normal and symmetric. Psychiatric:         Speech: Speech normal.         Behavior: Behavior normal.         Thought Content: Thought content normal.         Judgment: Judgment normal.        Diagnosis Orders   1. Well woman exam  PAP SMEAR    Human papillomavirus (HPV) DNA probe thin prep high risk      2. Screening for cervical cancer  PAP SMEAR      3. Screening for HPV (human papillomavirus)  Human papillomavirus (HPV) DNA probe thin prep high risk      4. Screen for STD (sexually transmitted disease)  C.trachomatis, N.gonorrhoeae, T.vaginalis Molecular, Thin Prep          MEDICATIONS:  No orders of the defined types were placed in this encounter.       ORDERS:  Orders Placed This Encounter   Procedures    C.trachomatis, N.gonorrhoeae, T.vaginalis Molecular, Thin Prep    PAP SMEAR    Human papillomavirus (HPV) DNA probe thin prep high risk       PLAN:  Pap collected w std screen. RTC prn or in 1 year    There are no Patient Instructions on file for this visit.

## 2022-09-29 LAB — RPR: NORMAL

## 2022-09-30 LAB
C. TRACHOMATIS DNA,THIN PREP: NOT DETECTED
N. GONORRHOEAE DNA, THIN PREP: NOT DETECTED
TRICHOMONAS VAGINALIS DNA: NOT DETECTED

## 2022-10-04 LAB
HPV COMMENT: ABNORMAL
HPV TYPE 16: NOT DETECTED
HPV TYPE 18: NOT DETECTED
HPVOH (OTHER TYPES): DETECTED

## 2022-10-07 ENCOUNTER — TELEPHONE (OUTPATIENT)
Dept: OBGYN CLINIC | Age: 37
End: 2022-10-07

## 2022-10-07 NOTE — TELEPHONE ENCOUNTER
Called and informed pt that her pap smear shows ASCUS and cannot exclude high grade, and HPV+. Colpo recommended. Pt will call back to schedule.

## 2022-10-07 NOTE — TELEPHONE ENCOUNTER
----- Message from SALOME Armijo sent at 10/5/2022  6:46 PM CDT -----  Please let pt know her pap showed atypical cells, cannot exclude high grade lesion/dysplasia and was hpv other +. Based on asccp guidelines and I plugged in past pap's and colpo in past, she needs another colpo!  Thank you!!!

## 2023-03-01 ENCOUNTER — TELEPHONE (OUTPATIENT)
Dept: OBGYN CLINIC | Age: 38
End: 2023-03-01

## 2023-03-01 NOTE — TELEPHONE ENCOUNTER
Sylvia requests that office return their call. The best time to reach her is Anytime. Patient would like to change to another provider in the office. She states she would like to get an IUD before her insurance changes and was hoping to get in before the end of the month. Thank you. Please order iud/mirena and we will call when it comes in.

## 2023-03-14 ENCOUNTER — TELEPHONE (OUTPATIENT)
Dept: OBGYN CLINIC | Age: 38
End: 2023-03-14

## 2023-03-14 DIAGNOSIS — N88.2 CERVICAL STENOSIS (UTERINE CERVIX): Primary | ICD-10-CM

## 2023-03-14 RX ORDER — MISOPROSTOL 100 UG/1
TABLET ORAL
Qty: 1 TABLET | Refills: 0 | Status: SHIPPED | OUTPATIENT
Start: 2023-03-14

## 2023-03-14 NOTE — TELEPHONE ENCOUNTER
We have received patients IUD in office. I called patient and got her scheduled 4/3/2023, please advise patient if they are needing Cytotec. Thank you!

## 2023-04-03 ENCOUNTER — PROCEDURE VISIT (OUTPATIENT)
Dept: OBGYN CLINIC | Age: 38
End: 2023-04-03
Payer: MEDICAID

## 2023-04-03 VITALS
HEART RATE: 90 BPM | WEIGHT: 165 LBS | HEIGHT: 60 IN | BODY MASS INDEX: 32.39 KG/M2 | DIASTOLIC BLOOD PRESSURE: 76 MMHG | SYSTOLIC BLOOD PRESSURE: 126 MMHG

## 2023-04-03 DIAGNOSIS — Z01.812 PRE-PROCEDURE LAB EXAM: ICD-10-CM

## 2023-04-03 DIAGNOSIS — Z30.430 ENCOUNTER FOR IUD INSERTION: Primary | ICD-10-CM

## 2023-04-03 LAB
CONTROL: NORMAL
CONTROL: NORMAL
PREGNANCY TEST URINE, POC: NEGATIVE
PREGNANCY TEST URINE, POC: POSITIVE

## 2023-04-03 PROCEDURE — 58300 INSERT INTRAUTERINE DEVICE: CPT | Performed by: NURSE PRACTITIONER

## 2023-04-03 PROCEDURE — 81025 URINE PREGNANCY TEST: CPT | Performed by: NURSE PRACTITIONER

## 2023-04-03 ASSESSMENT — ENCOUNTER SYMPTOMS
RESPIRATORY NEGATIVE: 1
ALLERGIC/IMMUNOLOGIC NEGATIVE: 1
EYES NEGATIVE: 1
GASTROINTESTINAL NEGATIVE: 1

## 2023-04-03 NOTE — PROGRESS NOTES
Ammy Noyola is a 44 y.o. female who presents today for her medical conditions/ complaints as noted below. Ammy Noyola is c/o of Procedure        HPI  Patient presents today for IUD insertion, neg upt in office. Patient's last menstrual period was 04/02/2023. [id number]    Past Medical History:   Diagnosis Date    Generalized headaches     HPV (human papilloma virus) anogenital infection     MTHFR mutation      Past Surgical History:   Procedure Laterality Date    COLPOSCOPY  01/22/2020    WISDOM TOOTH EXTRACTION       Family History   Problem Relation Age of Onset    Depression Mother     Other Sister         MTHRF    Heart Disease Paternal Grandmother     Heart Attack Paternal Grandmother     Heart Disease Paternal Grandfather     Diabetes Paternal Grandfather     Heart Attack Paternal Grandfather      Social History     Tobacco Use    Smoking status: Never    Smokeless tobacco: Never   Substance Use Topics    Alcohol use: No       Current Outpatient Medications   Medication Sig Dispense Refill    miSOPROStol (CYTOTEC) 100 MCG tablet Insert vaginally the night before procedure. 1 tablet 0     No current facility-administered medications for this visit. No Known Allergies  Vitals:    04/03/23 0848   BP: 126/76   Pulse: 90     Body mass index is 32.22 kg/m². Review of Systems   Constitutional: Negative. HENT: Negative. Eyes: Negative. Respiratory: Negative. Cardiovascular: Negative. Gastrointestinal: Negative. Endocrine: Negative. Genitourinary: Negative. Negative for difficulty urinating, dyspareunia, dysuria, enuresis, frequency, hematuria, menstrual problem, pelvic pain, urgency and vaginal discharge. Musculoskeletal: Negative. Skin: Negative. Allergic/Immunologic: Negative. Neurological: Negative. Hematological: Negative. Psychiatric/Behavioral: Negative. Physical Exam  Vitals and nursing note reviewed.    Constitutional:       General: She is not in

## 2023-10-02 ASSESSMENT — PATIENT HEALTH QUESTIONNAIRE - PHQ9
SUM OF ALL RESPONSES TO PHQ QUESTIONS 1-9: 0
2. FEELING DOWN, DEPRESSED OR HOPELESS: NOT AT ALL
SUM OF ALL RESPONSES TO PHQ QUESTIONS 1-9: 0
SUM OF ALL RESPONSES TO PHQ QUESTIONS 1-9: 0
1. LITTLE INTEREST OR PLEASURE IN DOING THINGS: NOT AT ALL
2. FEELING DOWN, DEPRESSED OR HOPELESS: 0
SUM OF ALL RESPONSES TO PHQ9 QUESTIONS 1 & 2: 0
SUM OF ALL RESPONSES TO PHQ9 QUESTIONS 1 & 2: 0
1. LITTLE INTEREST OR PLEASURE IN DOING THINGS: 0
SUM OF ALL RESPONSES TO PHQ QUESTIONS 1-9: 0

## 2023-10-03 ENCOUNTER — OFFICE VISIT (OUTPATIENT)
Dept: OBGYN CLINIC | Age: 38
End: 2023-10-03
Payer: MEDICAID

## 2023-10-03 VITALS
BODY MASS INDEX: 32 KG/M2 | HEIGHT: 60 IN | DIASTOLIC BLOOD PRESSURE: 83 MMHG | WEIGHT: 163 LBS | HEART RATE: 111 BPM | SYSTOLIC BLOOD PRESSURE: 116 MMHG

## 2023-10-03 DIAGNOSIS — Z11.51 SCREENING FOR HPV (HUMAN PAPILLOMAVIRUS): ICD-10-CM

## 2023-10-03 DIAGNOSIS — N60.12 BILATERAL FIBROCYSTIC BREAST CHANGES: ICD-10-CM

## 2023-10-03 DIAGNOSIS — Z01.419 WELL WOMAN EXAM: Primary | ICD-10-CM

## 2023-10-03 DIAGNOSIS — R10.31 RLQ ABDOMINAL PAIN: ICD-10-CM

## 2023-10-03 DIAGNOSIS — N60.11 BILATERAL FIBROCYSTIC BREAST CHANGES: ICD-10-CM

## 2023-10-03 DIAGNOSIS — Z11.3 SCREEN FOR STD (SEXUALLY TRANSMITTED DISEASE): ICD-10-CM

## 2023-10-03 DIAGNOSIS — Z12.4 SCREENING FOR CERVICAL CANCER: ICD-10-CM

## 2023-10-03 DIAGNOSIS — Z12.31 SCREENING MAMMOGRAM, ENCOUNTER FOR: ICD-10-CM

## 2023-10-03 DIAGNOSIS — Z30.431 INTRAUTERINE DEVICE SURVEILLANCE: ICD-10-CM

## 2023-10-03 DIAGNOSIS — Z01.419 WELL WOMAN EXAM: ICD-10-CM

## 2023-10-03 DIAGNOSIS — N63.11 BREAST LUMP ON RIGHT SIDE AT 11 O'CLOCK POSITION: ICD-10-CM

## 2023-10-03 LAB
25(OH)D3 SERPL-MCNC: 22.9 NG/ML
ALBUMIN SERPL-MCNC: 4.3 G/DL (ref 3.5–5.2)
ALP SERPL-CCNC: 84 U/L (ref 35–104)
ALT SERPL-CCNC: 9 U/L (ref 5–33)
ANION GAP SERPL CALCULATED.3IONS-SCNC: 14 MMOL/L (ref 7–19)
AST SERPL-CCNC: 15 U/L (ref 5–32)
BASOPHILS # BLD: 0.1 K/UL (ref 0–0.2)
BASOPHILS NFR BLD: 0.6 % (ref 0–1)
BILIRUB SERPL-MCNC: 0.3 MG/DL (ref 0.2–1.2)
BUN SERPL-MCNC: 8 MG/DL (ref 6–20)
CALCIUM SERPL-MCNC: 9 MG/DL (ref 8.6–10)
CHLORIDE SERPL-SCNC: 107 MMOL/L (ref 98–111)
CHOLEST SERPL-MCNC: 202 MG/DL (ref 160–199)
CO2 SERPL-SCNC: 22 MMOL/L (ref 22–29)
CREAT SERPL-MCNC: 0.5 MG/DL (ref 0.5–0.9)
EOSINOPHIL # BLD: 0.1 K/UL (ref 0–0.6)
EOSINOPHIL NFR BLD: 1.3 % (ref 0–5)
ERYTHROCYTE [DISTWIDTH] IN BLOOD BY AUTOMATED COUNT: 12.4 % (ref 11.5–14.5)
GLUCOSE SERPL-MCNC: 97 MG/DL (ref 74–109)
HAV IGM SERPL QL IA: NORMAL
HBA1C MFR BLD: 5.3 % (ref 4–6)
HBV CORE IGM SERPL QL IA: NORMAL
HBV SURFACE AG SERPL QL IA: NORMAL
HCT VFR BLD AUTO: 39.7 % (ref 37–47)
HCV AB SERPL QL IA: NORMAL
HDLC SERPL-MCNC: 63 MG/DL (ref 65–121)
HGB BLD-MCNC: 12.6 G/DL (ref 12–16)
HIV-1 P24 AG: NORMAL
HIV1+2 AB SERPLBLD QL IA.RAPID: NORMAL
IMM GRANULOCYTES # BLD: 0 K/UL
LDLC SERPL CALC-MCNC: 105 MG/DL
LYMPHOCYTES # BLD: 2.4 K/UL (ref 1.1–4.5)
LYMPHOCYTES NFR BLD: 30.5 % (ref 20–40)
MCH RBC QN AUTO: 29 PG (ref 27–31)
MCHC RBC AUTO-ENTMCNC: 31.7 G/DL (ref 33–37)
MCV RBC AUTO: 91.5 FL (ref 81–99)
MONOCYTES # BLD: 0.6 K/UL (ref 0–0.9)
MONOCYTES NFR BLD: 7.6 % (ref 0–10)
NEUTROPHILS # BLD: 4.6 K/UL (ref 1.5–7.5)
NEUTS SEG NFR BLD: 59.6 % (ref 50–65)
PLATELET # BLD AUTO: 318 K/UL (ref 130–400)
PMV BLD AUTO: 10.7 FL (ref 9.4–12.3)
POTASSIUM SERPL-SCNC: 4.3 MMOL/L (ref 3.5–5)
PROT SERPL-MCNC: 7.1 G/DL (ref 6.6–8.7)
RBC # BLD AUTO: 4.34 M/UL (ref 4.2–5.4)
SODIUM SERPL-SCNC: 143 MMOL/L (ref 136–145)
T4 FREE SERPL-MCNC: 1.13 NG/DL (ref 0.93–1.7)
TRIGL SERPL-MCNC: 168 MG/DL (ref 0–149)
TSH SERPL DL<=0.005 MIU/L-ACNC: 1.16 UIU/ML (ref 0.27–4.2)
WBC # BLD AUTO: 7.8 K/UL (ref 4.8–10.8)

## 2023-10-03 PROCEDURE — 99395 PREV VISIT EST AGE 18-39: CPT | Performed by: NURSE PRACTITIONER

## 2023-10-03 PROCEDURE — G8484 FLU IMMUNIZE NO ADMIN: HCPCS | Performed by: NURSE PRACTITIONER

## 2023-10-03 ASSESSMENT — ENCOUNTER SYMPTOMS
RESPIRATORY NEGATIVE: 1
EYES NEGATIVE: 1
ALLERGIC/IMMUNOLOGIC NEGATIVE: 1
ABDOMINAL PAIN: 1

## 2023-10-04 LAB
C TRACH DNA CVX QL NAA+PROBE: NOT DETECTED
N GONORRHOEA DNA SPEC QL NAA+PROBE: NOT DETECTED
TRICHOMONAS VAGINALIS DNA: NOT DETECTED

## 2023-10-05 LAB — RPR SER QL: NORMAL

## 2023-10-07 LAB
HPV HR 12 DNA SPEC QL NAA+PROBE: NOT DETECTED
HPV16 DNA SPEC QL NAA+PROBE: NOT DETECTED
HPV16+18+H RISK 12 DNA SPEC-IMP: NORMAL
HPV18 DNA SPEC QL NAA+PROBE: NOT DETECTED

## 2023-10-10 RX ORDER — ERGOCALCIFEROL 1.25 MG/1
50000 CAPSULE ORAL WEEKLY
Qty: 12 CAPSULE | Refills: 1 | Status: SHIPPED | OUTPATIENT
Start: 2023-10-10

## 2023-10-11 ENCOUNTER — TELEPHONE (OUTPATIENT)
Dept: OBGYN CLINIC | Age: 38
End: 2023-10-11

## 2023-10-11 NOTE — TELEPHONE ENCOUNTER
Mya Harper called to reschedule an appointment for  procedure . [facility] was unable to accommodate in the time frame needed. Please be advised that the best time to call Anytime. Mya Harper is requesting a sooner appointment than 11/8/23. Thank you.

## 2023-10-23 ENCOUNTER — HOSPITAL ENCOUNTER (OUTPATIENT)
Dept: WOMENS IMAGING | Age: 38
Discharge: HOME | End: 2023-10-23
Payer: MEDICAID

## 2023-10-23 DIAGNOSIS — N63.11 BREAST LUMP ON RIGHT SIDE AT 11 O'CLOCK POSITION: ICD-10-CM

## 2023-10-23 PROCEDURE — G0279 TOMOSYNTHESIS, MAMMO: HCPCS

## 2023-10-23 PROCEDURE — 76642 ULTRASOUND BREAST LIMITED: CPT

## 2023-10-30 ENCOUNTER — PROCEDURE VISIT (OUTPATIENT)
Dept: OBGYN CLINIC | Age: 38
End: 2023-10-30
Payer: MEDICAID

## 2023-10-30 VITALS
HEART RATE: 102 BPM | SYSTOLIC BLOOD PRESSURE: 130 MMHG | BODY MASS INDEX: 32.2 KG/M2 | HEIGHT: 60 IN | DIASTOLIC BLOOD PRESSURE: 81 MMHG | WEIGHT: 164 LBS

## 2023-10-30 DIAGNOSIS — R87.612 LGSIL ON PAP SMEAR OF CERVIX: Primary | ICD-10-CM

## 2023-10-30 PROCEDURE — 57454 BX/CURETT OF CERVIX W/SCOPE: CPT | Performed by: NURSE PRACTITIONER

## 2023-10-30 ASSESSMENT — ENCOUNTER SYMPTOMS
ALLERGIC/IMMUNOLOGIC NEGATIVE: 1
RESPIRATORY NEGATIVE: 1
EYES NEGATIVE: 1
GASTROINTESTINAL NEGATIVE: 1

## 2023-10-30 NOTE — PROGRESS NOTES
ECC, 12, 4, 10    Complications: none. Plan:  Specimens labelled and sent to Pathology. Will base further treatment on Pathology findings. Treatment options discussed with patient. Post biopsy instructions given to patient. Diagnosis Orders   1. LGSIL on Pap smear of cervix  Surgical Pathology    36726 - ME COLPOSC,CERVIX W/ADJ VAG,W/BX & CURRETAG          MEDICATIONS:  No orders of the defined types were placed in this encounter. ORDERS:  Orders Placed This Encounter   Procedures    Surgical Pathology    48283 - ME COLPOSC,CERVIX W/ADJ VAG,W/BX & CURRETAG       PLAN:  Plan  pending  There are no Patient Instructions on file for this visit.

## 2024-09-17 ENCOUNTER — TELEPHONE (OUTPATIENT)
Dept: OBGYN CLINIC | Age: 39
End: 2024-09-17

## 2024-09-17 NOTE — TELEPHONE ENCOUNTER
Sylvia called this afternoon to check when her next appt was scheduled for, she advised that she was supposed to come back in six months for a repeat colpo and wishes to know if it can be done on the day she comes in for her yearly Physical & Pap on 10/14. Patient also requested orders for basic labs and also for STD screening, she wishes to have same done prior to her appt.    Thank you.

## 2024-10-11 NOTE — PROGRESS NOTES
Sylvia Perez is a 39 y.o. female who presents today for her medical conditions/ complaints as noted below. Sylvia Perez is c/o of Annual Exam        HPI  Pt presents today for pap smear and breast exam.  Pt would like to have blood work ordered today. Pt states she would like a full panel and hormones also. Occasional hot flashes. Pt states she has not eaten today and would be able to go ahead and do the blood work. Pt would like STD ran off pap.   Not having periods, iud in place.    Mammo: 10/2023  Pap smear: 10/2023  Contraception: Mirena     P: 2   Ab: 0  Bone density: never   Colonoscopy: never   No LMP recorded (lmp unknown). (Menstrual status: IUD).      Past Medical History:   Diagnosis Date    Generalized headaches     HPV (human papilloma virus) anogenital infection     MTHFR mutation      Past Surgical History:   Procedure Laterality Date    COLPOSCOPY  2020    WISDOM TOOTH EXTRACTION       Family History   Problem Relation Age of Onset    Depression Mother     Other Sister         MTHRF    Heart Disease Paternal Grandmother     Heart Attack Paternal Grandmother     Heart Disease Paternal Grandfather     Diabetes Paternal Grandfather     Heart Attack Paternal Grandfather      Social History     Tobacco Use    Smoking status: Never    Smokeless tobacco: Never   Substance Use Topics    Alcohol use: No       Current Outpatient Medications   Medication Sig Dispense Refill    vitamin D (ERGOCALCIFEROL) 1.25 MG (29090 UT) CAPS capsule Take 1 capsule by mouth once a week 12 capsule 1    Levonorgestrel (MIRENA, 52 MG, IU) by IntraUTERine route       No current facility-administered medications for this visit.     No Known Allergies  Vitals:    10/14/24 0839   BP: 123/84   Pulse: 89     Body mass index is 30.08 kg/m².    Review of Systems   Constitutional:  Positive for diaphoresis.   HENT: Negative.     Eyes: Negative.    Respiratory: Negative.     Cardiovascular: Negative.    Gastrointestinal:

## 2024-10-14 ENCOUNTER — OFFICE VISIT (OUTPATIENT)
Dept: OBGYN CLINIC | Age: 39
End: 2024-10-14
Payer: COMMERCIAL

## 2024-10-14 VITALS
DIASTOLIC BLOOD PRESSURE: 84 MMHG | BODY MASS INDEX: 30.08 KG/M2 | WEIGHT: 154 LBS | SYSTOLIC BLOOD PRESSURE: 123 MMHG | HEART RATE: 89 BPM

## 2024-10-14 DIAGNOSIS — Z13.9 ENCOUNTER FOR MEDICAL SCREENING EXAMINATION: ICD-10-CM

## 2024-10-14 DIAGNOSIS — N91.2 AMENORRHEA: ICD-10-CM

## 2024-10-14 DIAGNOSIS — Z01.419 WELL WOMAN EXAM: Primary | ICD-10-CM

## 2024-10-14 DIAGNOSIS — Z11.51 SCREENING FOR HPV (HUMAN PAPILLOMAVIRUS): ICD-10-CM

## 2024-10-14 DIAGNOSIS — Z11.3 SCREEN FOR STD (SEXUALLY TRANSMITTED DISEASE): ICD-10-CM

## 2024-10-14 DIAGNOSIS — Z12.4 SCREENING FOR CERVICAL CANCER: ICD-10-CM

## 2024-10-14 DIAGNOSIS — Z30.431 INTRAUTERINE DEVICE SURVEILLANCE: ICD-10-CM

## 2024-10-14 DIAGNOSIS — Z12.31 SCREENING MAMMOGRAM, ENCOUNTER FOR: ICD-10-CM

## 2024-10-14 LAB
25(OH)D3 SERPL-MCNC: 24.1 NG/ML
ALBUMIN SERPL-MCNC: 4.2 G/DL (ref 3.5–5.2)
ALP SERPL-CCNC: 80 U/L (ref 35–104)
ALT SERPL-CCNC: 9 U/L (ref 5–33)
ANION GAP SERPL CALCULATED.3IONS-SCNC: 10 MMOL/L (ref 7–19)
AST SERPL-CCNC: 16 U/L (ref 5–32)
BASOPHILS # BLD: 0 K/UL (ref 0–0.2)
BASOPHILS NFR BLD: 0.6 % (ref 0–1)
BILIRUB SERPL-MCNC: 0.3 MG/DL (ref 0.2–1.2)
BUN SERPL-MCNC: 10 MG/DL (ref 6–20)
CALCIUM SERPL-MCNC: 8.6 MG/DL (ref 8.6–10)
CHLORIDE SERPL-SCNC: 105 MMOL/L (ref 98–111)
CHOLEST SERPL-MCNC: 187 MG/DL (ref 0–199)
CO2 SERPL-SCNC: 26 MMOL/L (ref 22–29)
CREAT SERPL-MCNC: 0.4 MG/DL (ref 0.5–0.9)
EOSINOPHIL # BLD: 0.1 K/UL (ref 0–0.6)
EOSINOPHIL NFR BLD: 1.6 % (ref 0–5)
ERYTHROCYTE [DISTWIDTH] IN BLOOD BY AUTOMATED COUNT: 12.1 % (ref 11.5–14.5)
FSH SERPL-SCNC: 5.7 MIU/ML
GLUCOSE SERPL-MCNC: 104 MG/DL (ref 70–99)
HBA1C MFR BLD: 5.2 % (ref 4–5.6)
HCT VFR BLD AUTO: 36 % (ref 37–47)
HDLC SERPL-MCNC: 67 MG/DL (ref 40–60)
HGB BLD-MCNC: 11.6 G/DL (ref 12–16)
IMM GRANULOCYTES # BLD: 0 K/UL
LDLC SERPL CALC-MCNC: 108 MG/DL
LYMPHOCYTES # BLD: 2.5 K/UL (ref 1.1–4.5)
LYMPHOCYTES NFR BLD: 36.2 % (ref 20–40)
MCH RBC QN AUTO: 29.2 PG (ref 27–31)
MCHC RBC AUTO-ENTMCNC: 32.2 G/DL (ref 33–37)
MCV RBC AUTO: 90.7 FL (ref 81–99)
MONOCYTES # BLD: 0.5 K/UL (ref 0–0.9)
MONOCYTES NFR BLD: 7.6 % (ref 0–10)
NEUTROPHILS # BLD: 3.7 K/UL (ref 1.5–7.5)
NEUTS SEG NFR BLD: 53.7 % (ref 50–65)
PLATELET # BLD AUTO: 311 K/UL (ref 130–400)
PMV BLD AUTO: 10.2 FL (ref 9.4–12.3)
POTASSIUM SERPL-SCNC: 4.6 MMOL/L (ref 3.5–5)
PROT SERPL-MCNC: 6.7 G/DL (ref 6.4–8.3)
RBC # BLD AUTO: 3.97 M/UL (ref 4.2–5.4)
SODIUM SERPL-SCNC: 141 MMOL/L (ref 136–145)
T4 FREE SERPL-MCNC: 1.12 NG/DL (ref 0.93–1.7)
TRIGL SERPL-MCNC: 59 MG/DL (ref 0–149)
TSH SERPL DL<=0.005 MIU/L-ACNC: 0.89 UIU/ML (ref 0.27–4.2)
WBC # BLD AUTO: 6.8 K/UL (ref 4.8–10.8)

## 2024-10-14 PROCEDURE — 99396 PREV VISIT EST AGE 40-64: CPT | Performed by: NURSE PRACTITIONER

## 2024-10-20 RX ORDER — METRONIDAZOLE 500 MG/1
500 TABLET ORAL 2 TIMES DAILY
Qty: 14 TABLET | Refills: 0 | Status: SHIPPED | OUTPATIENT
Start: 2024-10-20 | End: 2024-10-27

## 2025-02-24 ENCOUNTER — TELEPHONE (OUTPATIENT)
Dept: OBGYN CLINIC | Age: 40
End: 2025-02-24

## 2025-02-24 NOTE — TELEPHONE ENCOUNTER
Called and spoke to pt at this time regarding mammogram. Pt transferred to [facility] to get scheduled.